# Patient Record
Sex: FEMALE | Race: OTHER
[De-identification: names, ages, dates, MRNs, and addresses within clinical notes are randomized per-mention and may not be internally consistent; named-entity substitution may affect disease eponyms.]

---

## 2020-02-10 ENCOUNTER — HOSPITAL ENCOUNTER (EMERGENCY)
Dept: HOSPITAL 61 - ER | Age: 55
Discharge: HOME | End: 2020-02-10
Payer: SELF-PAY

## 2020-02-10 VITALS — WEIGHT: 156.53 LBS | HEIGHT: 62 IN | BODY MASS INDEX: 28.8 KG/M2

## 2020-02-10 VITALS
DIASTOLIC BLOOD PRESSURE: 73 MMHG | DIASTOLIC BLOOD PRESSURE: 73 MMHG | SYSTOLIC BLOOD PRESSURE: 122 MMHG | DIASTOLIC BLOOD PRESSURE: 73 MMHG | SYSTOLIC BLOOD PRESSURE: 122 MMHG | SYSTOLIC BLOOD PRESSURE: 122 MMHG

## 2020-02-10 DIAGNOSIS — R11.0: ICD-10-CM

## 2020-02-10 DIAGNOSIS — R10.33: ICD-10-CM

## 2020-02-10 DIAGNOSIS — N83.201: ICD-10-CM

## 2020-02-10 DIAGNOSIS — N39.0: Primary | ICD-10-CM

## 2020-02-10 DIAGNOSIS — R68.83: ICD-10-CM

## 2020-02-10 LAB
ALBUMIN SERPL-MCNC: 3.7 G/DL (ref 3.4–5)
ALBUMIN/GLOB SERPL: 1 {RATIO} (ref 1–1.7)
ALP SERPL-CCNC: 95 U/L (ref 46–116)
ALT SERPL-CCNC: 27 U/L (ref 14–59)
ANION GAP SERPL CALC-SCNC: 13 MMOL/L (ref 6–14)
APTT PPP: (no result) S
AST SERPL-CCNC: 15 U/L (ref 15–37)
BACTERIA #/AREA URNS HPF: (no result) /HPF
BASOPHILS # BLD AUTO: 0.1 X10^3/UL (ref 0–0.2)
BASOPHILS NFR BLD: 1 % (ref 0–3)
BILIRUB SERPL-MCNC: 0.4 MG/DL (ref 0.2–1)
BILIRUB UR QL STRIP: NEGATIVE
BUN SERPL-MCNC: 14 MG/DL (ref 7–20)
BUN/CREAT SERPL: 23 (ref 6–20)
CALCIUM SERPL-MCNC: 8.9 MG/DL (ref 8.5–10.1)
CHLORIDE SERPL-SCNC: 106 MMOL/L (ref 98–107)
CO2 SERPL-SCNC: 27 MMOL/L (ref 21–32)
CREAT SERPL-MCNC: 0.6 MG/DL (ref 0.6–1)
EOSINOPHIL NFR BLD: 0.2 X10^3/UL (ref 0–0.7)
EOSINOPHIL NFR BLD: 3 % (ref 0–3)
ERYTHROCYTE [DISTWIDTH] IN BLOOD BY AUTOMATED COUNT: 14.4 % (ref 11.5–14.5)
FIBRINOGEN PPP-MCNC: (no result) MG/DL
GFR SERPLBLD BASED ON 1.73 SQ M-ARVRAT: 104.2 ML/MIN
GLOBULIN SER-MCNC: 3.8 G/DL (ref 2.2–3.8)
GLUCOSE SERPL-MCNC: 97 MG/DL (ref 70–99)
HCT VFR BLD CALC: 37.3 % (ref 36–47)
HGB BLD-MCNC: 12.4 G/DL (ref 12–15.5)
LIPASE: 104 U/L (ref 73–393)
LYMPHOCYTES # BLD: 2.3 X10^3/UL (ref 1–4.8)
LYMPHOCYTES NFR BLD AUTO: 31 % (ref 24–48)
MCH RBC QN AUTO: 26 PG (ref 25–35)
MCHC RBC AUTO-ENTMCNC: 33 G/DL (ref 31–37)
MCV RBC AUTO: 78 FL (ref 79–100)
MONO #: 0.7 X10^3/UL (ref 0–1.1)
MONOCYTES NFR BLD: 10 % (ref 0–9)
NEUT #: 4.1 X10^3/UL (ref 1.8–7.7)
NEUTROPHILS NFR BLD AUTO: 55 % (ref 31–73)
NITRITE UR QL STRIP: NEGATIVE
PH UR STRIP: 6 [PH]
PLATELET # BLD AUTO: 317 X10^3/UL (ref 140–400)
POTASSIUM SERPL-SCNC: 3.7 MMOL/L (ref 3.5–5.1)
PROT SERPL-MCNC: 7.5 G/DL (ref 6.4–8.2)
PROT UR STRIP-MCNC: NEGATIVE MG/DL
RBC # BLD AUTO: 4.8 X10^6/UL (ref 3.5–5.4)
RBC #/AREA URNS HPF: (no result) /HPF (ref 0–2)
SODIUM SERPL-SCNC: 146 MMOL/L (ref 136–145)
SQUAMOUS #/AREA URNS LPF: (no result) /LPF
UROBILINOGEN UR-MCNC: 0.2 MG/DL
WBC # BLD AUTO: 7.5 X10^3/UL (ref 4–11)
WBC #/AREA URNS HPF: (no result) /HPF (ref 0–4)

## 2020-02-10 PROCEDURE — 81025 URINE PREGNANCY TEST: CPT

## 2020-02-10 PROCEDURE — 96374 THER/PROPH/DIAG INJ IV PUSH: CPT

## 2020-02-10 PROCEDURE — 87086 URINE CULTURE/COLONY COUNT: CPT

## 2020-02-10 PROCEDURE — 96375 TX/PRO/DX INJ NEW DRUG ADDON: CPT

## 2020-02-10 PROCEDURE — 85025 COMPLETE CBC W/AUTO DIFF WBC: CPT

## 2020-02-10 PROCEDURE — 83690 ASSAY OF LIPASE: CPT

## 2020-02-10 PROCEDURE — 36415 COLL VENOUS BLD VENIPUNCTURE: CPT

## 2020-02-10 PROCEDURE — 80053 COMPREHEN METABOLIC PANEL: CPT

## 2020-02-10 PROCEDURE — 74177 CT ABD & PELVIS W/CONTRAST: CPT

## 2020-02-10 PROCEDURE — 81001 URINALYSIS AUTO W/SCOPE: CPT

## 2020-02-10 PROCEDURE — 99285 EMERGENCY DEPT VISIT HI MDM: CPT

## 2020-02-10 NOTE — RAD
CT abdomen pelvis with contrast dated 2/10/2020.

 

No comparison available.

 

Clinical data indication: Periumbilical pain and left lower quadrant pain.

 

TECHNIQUE: Per contiguous axial imaging the M pelvis performed after the 

administration of 75 cc Omni 300.

 

FINDINGS:

 

Limited images of lung bases are clear. Heart size within normal limits. 

No pleural or pericardial effusion.

 

Liver, spleen, pancreas, adrenal glands, gallbladder and kidneys are 

unremarkable. No hydronephrosis.

 

Unopacified GI tract normal in caliber and contour. No focal bowel wall 

thickening. No inflammatory stranding in the mesentery. No ascites or 

lymphadenopathy. Abdominal aorta normal in caliber. Appendix normal in 

caliber.

 

Images of pelvis show nondistended urinary bladder. Uterus and adnexa 

unremarkable. There is a 1.7 cm right ovarian cyst. No free fluid or 

pelvic lymphadenopathy.

 

Bone windows show no acute findings.

 

IMPRESSION:

1. No acute abnormality of abdomen or pelvis. Normal appendix.

2. Small right ovarian cyst.

 

Electronically signed by: Hugh Sandoval MD (2/10/2020 11:41 AM) 

[facility]

## 2020-02-10 NOTE — PHYS DOC
Past Medical History


Past Medical History:  No Pertinent History


Past Surgical History:  No Surgical History


Smoking Status:  Never Smoker


Alcohol Use:  None


Drug Use:  None





Adult General


Chief Complaint


Chief Complaint:  ABDOMINAL PAIN





HPI


HPI





Patient is a 54  year old female who presents with abdominal pain is located in 

her periumbilical region of her abdomen, and radiates up to the left upper quad

rant this been ongoing for 3 weeks. The patient states the pain is sharp, 

constant and she rates it 8 out of 10 in severity. The patient also states she's

had associated symptoms include nausea, and every once while she has chills. The

patient denies any fevers, vomiting, or diarrhea.





Complete ROS were reviewed and found to be within normal limits, except as 

documented in the HPI





Current Medications


Current Medications





Current Medications








 Medications


  (Trade)  Dose


 Ordered  Sig/Ayaz  Start Time


 Stop Time Status Last Admin


Dose Admin


 


 Fentanyl Citrate


  (Fentanyl 2ml


 Vial)  75 mcg  1X  ONCE  2/10/20 10:15


 2/10/20 10:16 DC 2/10/20 10:33


75 MCG


 


 Iohexol


  (Omnipaque 300


 Mg/ml)  75 ml  1X  ONCE  2/10/20 10:30


 2/10/20 10:31 DC 2/10/20 10:30


75 ML


 


 Ondansetron HCl


  (Zofran)  4 mg  1X  ONCE  2/10/20 10:15


 2/10/20 10:16 DC 2/10/20 10:32


4 MG


 


 Sodium Chloride  1,000 ml @ 


 1,000 mls/hr  1X  ONCE  2/10/20 10:15


 2/10/20 11:14 DC 2/10/20 10:30


1,000 MLS/HR











Allergies


Allergies





Allergies








Coded Allergies Type Severity Reaction Last Updated Verified


 


  No Known Drug Allergies    19 No











Physical Exam


Physical Exam





Constitutional: Well developed, well nourished, no acute distress, non-toxic 

appearance. []


HENT: Normocephalic, atraumatic, bilateral external ears normal, oropharynx 

moist, no oral exudates, nose normal. []


Eyes: PERRLA, EOMI, conjunctiva normal, no discharge. [] 


Neck: Normal range of motion, no tenderness, supple, no stridor. [] 


Cardiovascular:Heart rate regular rhythm, no murmur []


Lungs & Thorax:  Bilateral breath sounds clear to auscultation []


Abdomen: Bowel sounds normal, soft, periumbilical tenderness, LUQ tenderness, no

 masses, no pulsatile masses. [] 


Skin: Warm, dry, no erythema, no rash. []  


Neurologic: Alert and oriented X 3, normal motor function, normal sensory 

function, no focal deficits noted. []


Psychologic: Affect normal, judgement normal, mood normal. []





Current Patient Data


Vital Signs





                                   Vital Signs








  Date Time  Temp Pulse Resp B/P (MAP) Pulse Ox O2 Delivery O2 Flow Rate FiO2


 


2/10/20 10:33   16  99 Room Air  


 


2/10/20 10:10 98.0 16  133/64 (87)    





 98.0       








Lab Values





                                Laboratory Tests








Test


 2/10/20


10:02 2/10/20


11:28


 


White Blood Count


 7.5 x10^3/uL


(4.0-11.0) 





 


Red Blood Count


 4.80 x10^6/uL


(3.50-5.40) 





 


Hemoglobin


 12.4 g/dL


(12.0-15.5) 





 


Hematocrit


 37.3 %


(36.0-47.0) 





 


Mean Corpuscular Volume


 78 fL ()


L 





 


Mean Corpuscular Hemoglobin 26 pg (25-35)   


 


Mean Corpuscular Hemoglobin


Concent 33 g/dL


(31-37) 





 


Red Cell Distribution Width


 14.4 %


(11.5-14.5) 





 


Platelet Count


 317 x10^3/uL


(140-400) 





 


Neutrophils (%) (Auto) 55 % (31-73)   


 


Lymphocytes (%) (Auto) 31 % (24-48)   


 


Monocytes (%) (Auto) 10 % (0-9)  H 


 


Eosinophils (%) (Auto) 3 % (0-3)   


 


Basophils (%) (Auto) 1 % (0-3)   


 


Neutrophils # (Auto)


 4.1 x10^3/uL


(1.8-7.7) 





 


Lymphocytes # (Auto)


 2.3 x10^3/uL


(1.0-4.8) 





 


Monocytes # (Auto)


 0.7 x10^3/uL


(0.0-1.1) 





 


Eosinophils # (Auto)


 0.2 x10^3/uL


(0.0-0.7) 





 


Basophils # (Auto)


 0.1 x10^3/uL


(0.0-0.2) 





 


Urine Collection Type Void   


 


Urine Color Karly   


 


Urine Clarity Cloudy   


 


Urine pH 6.0   


 


Urine Specific Gravity >=1.030   


 


Urine Protein


 Negative mg/dL


(NEG-TRACE) 





 


Urine Glucose (UA)


 Negative mg/dL


(NEG) 





 


Urine Ketones (Stick)


 Negative mg/dL


(NEG) 





 


Urine Blood


 Negative (NEG)


 





 


Urine Nitrite


 Negative (NEG)


 





 


Urine Bilirubin


 Negative (NEG)


 





 


Urine Urobilinogen Dipstick


 0.2 mg/dL (0.2


mg/dL) 





 


Urine Leukocyte Esterase


 Moderate (NEG)


 





 


Urine RBC


 Occ /HPF (0-2)


 





 


Urine WBC


 5-10 /HPF


(0-4) 





 


Urine Squamous Epithelial


Cells Many /LPF  


 





 


Urine Bacteria


 Moderate /HPF


(0-FEW) 





 


Urine Mucus Mod /LPF   


 


Sodium Level


 146 mmol/L


(136-145)  H 





 


Potassium Level


 3.7 mmol/L


(3.5-5.1) 





 


Chloride Level


 106 mmol/L


() 





 


Carbon Dioxide Level


 27 mmol/L


(21-32) 





 


Anion Gap 13 (6-14)   


 


Blood Urea Nitrogen


 14 mg/dL


(7-20) 





 


Creatinine


 0.6 mg/dL


(0.6-1.0) 





 


Estimated GFR


(Cockcroft-Gault) 104.2  


 





 


BUN/Creatinine Ratio 23 (6-20)  H 


 


Glucose Level


 97 mg/dL


(70-99) 





 


Calcium Level


 8.9 mg/dL


(8.5-10.1) 





 


Total Bilirubin


 0.4 mg/dL


(0.2-1.0) 





 


Aspartate Amino Transferase


(AST) 15 U/L (15-37)


 





 


Alanine Aminotransferase (ALT)


 27 U/L (14-59)


 





 


Alkaline Phosphatase


 95 U/L


() 





 


Total Protein


 7.5 g/dL


(6.4-8.2) 





 


Albumin


 3.7 g/dL


(3.4-5.0) 





 


Albumin/Globulin Ratio 1.0 (1.0-1.7)   


 


Lipase


 104 U/L


() 





 


POC Urine HCG, Qualitative


 


 Hcg negative


(Negative)





                                Laboratory Tests


2/10/20 10:02








                                Laboratory Tests


2/10/20 10:02











EKG


EKG


[]





Radiology/Procedures


Radiology/Procedures


[]


                                 IMAGING REPORT





                                     Signed





PATIENT: SUSIE SALAZARACCOUNT: [id number]     MRN#: [id number]


: 1965           LOCATION: ER              AGE: 54


SEX: F                    EXAM DT: 02/10/20         ACCESSION#: [id number]


STATUS: REG ER            ORD. PHYSICIAN: HUGH MEJIA


REASON: abd pain, periumbilical, LUQ


PROCEDURE: CT ABD PELV W/ IV CONTRST ONLY





CT abdomen pelvis with contrast dated 2/10/2020.


 


No comparison available.


 


Clinical data indication: Periumbilical pain and left lower quadrant pain.


 


TECHNIQUE: Per contiguous axial imaging the M pelvis performed after the 


administration of 75 cc Omni 300.


 


FINDINGS:


 


Limited images of lung bases are clear. Heart size within normal limits. 


No pleural or pericardial effusion.


 


Liver, spleen, pancreas, adrenal glands, gallbladder and kidneys are 


unremarkable. No hydronephrosis.


 


Unopacified GI tract normal in caliber and contour. No focal bowel wall 


thickening. No inflammatory stranding in the mesentery. No ascites or 


lymphadenopathy. Abdominal aorta normal in caliber. Appendix normal in 


caliber.


 


Images of pelvis show nondistended urinary bladder. Uterus and adnexa 


unremarkable. There is a 1.7 cm right ovarian cyst. No free fluid or 


pelvic lymphadenopathy.


 


Bone windows show no acute findings.


 


IMPRESSION:


1. No acute abnormality of abdomen or pelvis. Normal appendix.


2. Small right ovarian cyst.


 


Electronically signed by: Hugh Sandoval MD (2/10/2020 11:41 AM) 


[facility]














DICTATED and SIGNED BY:     HUGH SANDOVAL MD


DATE:     02/10/20 1141





Course & Med Decision Making


Course & Med Decision Making


Pertinent Labs and Imaging studies reviewed. (See chart for details)





Will get CT, labs, UA, and give supportive care.





CT is unremarkable with exception of ovarian cyst. Urine shows leukocytes. Will 

place on Keflex. Labs are unremarkable for acute changes.





Dragon Disclaimer


Tyler Disclaimer


This electronic medical record was generated, in whole or in part, using a voice

 recognition dictation system.





Departure


Departure


Impression:  


   Primary Impression:  


   Urinary tract infection


   Additional Impression:  


   Ovarian cyst


Disposition:  01 HOME, SELF-CARE


Condition:  STABLE


Referrals:  


UNKNOWN PCP NAME (PCP)








KAILEY VARMA MD


Patient Instructions:  Ovarian Cyst, Urinary Tract Infection





Additional Instructions:  


Thank you for visiting [facility]. We appreciate you trusting us 

with your care. If any additional problems come up [facility] Please follow up with your primary care provider so they can plan 

additional care if needed and know about the problem that you had. If symptoms 

worsen come back to the Emergency Department. Any concerning symptoms that start

 such as chest pain, shortness of air, weakness or numbness on one side of the 

body, running high fevers or any other concerning symptoms return to the ER.





You have been prescribed an antibiotic today to help fight your infection. 

Please take all of the antibiotic as directed. If after 48 hours the infection 

is not improving, please return for more care. If the infection worsens, return 

to ER for additional care.


Scripts


Cephalexin (KEFLEX) 500 Mg Capsule


1 CAP PO BID for 7 Days, #14 CAP 0 Refills


   Prov: HUGH MEJIA         2/10/20





Problem Qualifiers











HUGH MEJIA          Feb 10, 2020 10:17

## 2020-06-24 ENCOUNTER — HOSPITAL ENCOUNTER (OUTPATIENT)
Dept: HOSPITAL 61 - CT | Age: 55
Discharge: HOME | End: 2020-06-24
Attending: FAMILY MEDICINE
Payer: COMMERCIAL

## 2020-06-24 DIAGNOSIS — N92.0: ICD-10-CM

## 2020-06-24 DIAGNOSIS — N83.291: Primary | ICD-10-CM

## 2020-06-24 DIAGNOSIS — N28.89: ICD-10-CM

## 2020-06-24 DIAGNOSIS — I70.0: ICD-10-CM

## 2020-06-24 DIAGNOSIS — M47.895: ICD-10-CM

## 2020-06-24 DIAGNOSIS — E78.5: ICD-10-CM

## 2020-06-24 DIAGNOSIS — M43.8X5: ICD-10-CM

## 2020-06-24 PROCEDURE — 74176 CT ABD & PELVIS W/O CONTRAST: CPT

## 2020-06-24 NOTE — RAD
CT scan abdomen and pelvis without contrast 6/24/2020

 

CLINICAL HISTORY: Left lower quadrant abdominal pain.

 

TECHNIQUE: Unenhanced, contiguous, 2 mm axial sections were obtained 

through abdomen and pelvis.

 

One or more of the following individualized dose reduction techniques were

utilized for this study:

 

1. Automated exposure control.

2. Adjustment of the mA and/or kV according to patient size.

3. Use of iterative reconstruction technique.

 

 

FINDINGS: Comparison study is dated 2/10/2020.

 

Images through the lung bases are within normal limits.

 

The liver, spleen, pancreas, and adrenal glands are within normal limits. 

No renal or ureteral calculus is seen. There is no evidence of obstruction

of either collecting system.

 

Mild atherosclerotic calcification of the abdominal aorta is seen. The 

abdominal aorta tapers normally. The gallbladder is contracted. There is 

no evidence of bowel obstruction. The appendix is well-visualized and is 

within normal limits.

 

Images through the pelvis demonstrate the urinary bladder distended with 

urine. Calcifications are seen within the pelvis consistent with 

phleboliths. No adnexal mass is seen. No free fluid is noted.

 

Minimal S-shaped curvature of the thoracolumbar spine is seen. 

Degenerative changes are seen involving lower thoracic and throughout the 

lumbar spine along with both hips.

 

IMPRESSION: No acute abnormality is seen.

 

 

 

Electronically signed by: Shahab Cabrera MD (6/24/2020 2:36 PM) [id number]

## 2021-12-20 ENCOUNTER — HOSPITAL ENCOUNTER (OUTPATIENT)
Dept: HOSPITAL 61 - MRI | Age: 56
End: 2021-12-20
Attending: FAMILY MEDICINE
Payer: COMMERCIAL

## 2021-12-20 DIAGNOSIS — M94.9: Primary | ICD-10-CM

## 2021-12-20 DIAGNOSIS — M75.51: ICD-10-CM

## 2021-12-20 DIAGNOSIS — M75.101: ICD-10-CM

## 2021-12-20 DIAGNOSIS — M19.011: ICD-10-CM

## 2021-12-20 PROCEDURE — 73223 MRI JOINT UPR EXTR W/O&W/DYE: CPT

## 2021-12-20 NOTE — RAD
STUDY: MRI of the right shoulder with and without contrast



INDICATION: Right shoulder pain. Chondral lesion. 



COMPARISON: None. 



TECHNIQUE: Multiplanar MR imaging of the right shoulder performed both prior to and after the intrave
nous administration of 14 cc Clariscan.



FINDINGS:



The study is limited on account of motion.



AC joint: Moderate AC joint arthrosis. Mild subacromial subdeltoid bursitis. 



Rotator cuff: High-grade tear of the supraspinatus leading edge at the footprint which appears to be 
bursal sided and measuring up to 5 mm mediolateral by 6 mm AP. Background hypertrophic tendinosis of 
the supraspinatus and of the infraspinatus to a lesser extent. The teres minor is intact as is the alvarado
bscapularis. Rotator cuff muscular bulk is maintained.



Labrum: Appears intact considering limited evaluation by technique and motion. 



Long head biceps tendon: Intact and normally located. 



Cartilage: No well delineated high-grade/full-thickness chondral defect. 



Bones: Lobulated lesion with heterogeneous T2 signal and enhancement centered within the humeral meta
physis below the physeal scar and extending downward just below the surgical neck. Small intermixed f
oci of T1 and T2 hypointense signal similar to cortex but much of the abnormality is intermediate T1 
similar to skeletal muscle. Portions of the lesion abut the humeral cortex without extraosseous exten
real or definitive endosteal scalloping. At the more anterior humeral head such as on image 14 series
 7001 there is a narrow zone of transition but more posteriorly borders are indistinct. The collectiv
e abnormality is difficult to measure but the region of involvement measures up to 2.6 cm AP by 2.7 c
m craniocaudal by 2.2 cm transverse. Surrounding marrow signal is heterogeneous but without perilesio
nal edema based on the sagittal T2 sequence which is less affected by motion.



Miscellaneous: Only a small amount of joint fluid. Mild subcoracoid bursitis in conjunction with suba
cromial/subdeltoid bursitis. Edema-like signal at the rotator interval. Within normal limits axillary
 recess. No pathologically enlarged axillary lymph nodes which are subcentimeter short axis.



Impression:



1.  Heterogeneous signal abnormality centered within the humeral metaphysis with features most sugges
tive of an enchondroma at measures approximately 2.6 x 2.7 x 2.2 cm. A portion of the lesion exhibits
 a narrow zone of transition but some areas are less well-defined. No extraosseous extension, periles
ional edema or obvious endosteal scalloping to help suggest an chondrosarcoma. Given that there are a
lternative sources of pain at the right shoulder, as discussed below, ongoing follow-up is recommende
d to confirm stability such as MRI in 6 months. If radiographs have not been performed they are recom
mended to establish a baseline.

2.  Focal high-grade tear of the supraspinatus leading edge at the footprint which appears to be burs
al sided but the study is limited by motion. The tear defect measures 5 x 6 mm. Background supraspina
tus more so than infraspinatus tendinosis.

3.  Mild subacromial subdeltoid and subcoracoid bursitis. Moderate arthrosis at the AC joint. Nonspec
ific edema at the rotator interval but this can be seen with adhesive capsulitis.



Electronically signed by: JOSE DE JESUS TANNER MD (12/20/2021 6:28 PM) [id number]